# Patient Record
Sex: MALE | Race: WHITE
[De-identification: names, ages, dates, MRNs, and addresses within clinical notes are randomized per-mention and may not be internally consistent; named-entity substitution may affect disease eponyms.]

---

## 2017-07-13 ENCOUNTER — HOSPITAL ENCOUNTER (OUTPATIENT)
Dept: HOSPITAL 53 - M SMT | Age: 72
End: 2017-07-13
Attending: ALLERGY & IMMUNOLOGY
Payer: MEDICARE

## 2017-07-13 DIAGNOSIS — R10.13: Primary | ICD-10-CM

## 2017-07-13 DIAGNOSIS — R19.7: ICD-10-CM

## 2017-09-25 ENCOUNTER — HOSPITAL ENCOUNTER (OUTPATIENT)
Dept: HOSPITAL 53 - M LAB | Age: 72
End: 2017-09-25
Attending: INTERNAL MEDICINE
Payer: MEDICARE

## 2017-09-25 DIAGNOSIS — R19.7: Primary | ICD-10-CM

## 2017-09-25 LAB
ALBUMIN SERPL BCG-MCNC: 3.8 GM/DL (ref 3.2–5.2)
ALBUMIN/GLOB SERPL: 1.23 {RATIO} (ref 1–1.93)
ALP SERPL-CCNC: 73 U/L (ref 45–117)
ALT SERPL W P-5'-P-CCNC: 34 U/L (ref 12–78)
AST SERPL-CCNC: 21 U/L (ref 15–37)
BASOPHILS # BLD AUTO: 0 10^3/UL (ref 0–0.2)
BASOPHILS NFR BLD AUTO: 0.4 % (ref 0–1)
BILIRUB CONJ SERPL-MCNC: 0.1 MG/DL (ref 0–0.2)
BILIRUB SERPL-MCNC: 0.3 MG/DL (ref 0.2–1)
EOSINOPHIL # BLD AUTO: 0.2 10^3/UL (ref 0–0.5)
EOSINOPHIL NFR BLD AUTO: 1.9 % (ref 0–3)
ERYTHROCYTE [DISTWIDTH] IN BLOOD BY AUTOMATED COUNT: 13.1 % (ref 11.5–14.5)
IGA SERPL-MCNC: 247 MG/DL (ref 70–400)
IMM GRANULOCYTES NFR BLD: 0.5 % (ref 0–0)
LYMPHOCYTES # BLD AUTO: 1.2 10^3/UL (ref 1.5–4.5)
LYMPHOCYTES NFR BLD AUTO: 14.8 % (ref 24–44)
MCH RBC QN AUTO: 32.3 PG (ref 27–33)
MCHC RBC AUTO-ENTMCNC: 34.4 G/DL (ref 32–36.5)
MCV RBC AUTO: 93.8 FL (ref 80–96)
MONOCYTES # BLD AUTO: 0.8 10^3/UL (ref 0–0.8)
MONOCYTES NFR BLD AUTO: 9.9 % (ref 0–5)
NEUTROPHILS # BLD AUTO: 6.1 10^3/UL (ref 1.8–7.7)
NEUTROPHILS NFR BLD AUTO: 72.5 % (ref 36–66)
NRBC BLD AUTO-RTO: 0 % (ref 0–0)
PLATELET # BLD AUTO: 222 10^3/UL (ref 150–450)
PROT SERPL-MCNC: 6.9 GM/DL (ref 6.4–8.2)
WBC # BLD AUTO: 8.4 10^3/UL (ref 4–10)

## 2017-09-26 ENCOUNTER — HOSPITAL ENCOUNTER (OUTPATIENT)
Dept: HOSPITAL 53 - M LAB REF | Age: 72
End: 2017-09-26
Attending: INTERNAL MEDICINE
Payer: MEDICARE

## 2017-09-26 DIAGNOSIS — R19.7: Primary | ICD-10-CM

## 2017-10-13 ENCOUNTER — HOSPITAL ENCOUNTER (EMERGENCY)
Dept: HOSPITAL 53 - M ED | Age: 72
Discharge: HOME | End: 2017-10-13
Payer: MEDICARE

## 2017-10-13 VITALS — SYSTOLIC BLOOD PRESSURE: 109 MMHG | DIASTOLIC BLOOD PRESSURE: 64 MMHG

## 2017-10-13 VITALS — BODY MASS INDEX: 20.7 KG/M2 | WEIGHT: 124.25 LBS | HEIGHT: 65 IN

## 2017-10-13 DIAGNOSIS — G89.29: ICD-10-CM

## 2017-10-13 DIAGNOSIS — Z88.1: ICD-10-CM

## 2017-10-13 DIAGNOSIS — G43.909: Primary | ICD-10-CM

## 2017-10-13 DIAGNOSIS — Z87.891: ICD-10-CM

## 2017-10-13 DIAGNOSIS — Z79.899: ICD-10-CM

## 2017-10-13 LAB
ALBUMIN SERPL BCG-MCNC: 2.2 GM/DL (ref 3.2–5.2)
ALBUMIN/GLOB SERPL: 0.63 {RATIO} (ref 1–1.93)
ALP SERPL-CCNC: 94 U/L (ref 45–117)
ALT SERPL W P-5'-P-CCNC: 37 U/L (ref 12–78)
ANION GAP SERPL CALC-SCNC: 12 MEQ/L (ref 8–16)
AST SERPL-CCNC: 23 U/L (ref 15–37)
BASOPHILS # BLD AUTO: 0 10^3/UL (ref 0–0.2)
BASOPHILS NFR BLD AUTO: 0.2 % (ref 0–1)
BILIRUB CONJ SERPL-MCNC: 0.2 MG/DL (ref 0–0.2)
BILIRUB SERPL-MCNC: 0.5 MG/DL (ref 0.2–1)
BUN SERPL-MCNC: 17 MG/DL (ref 7–18)
CALCIUM SERPL-MCNC: 8 MG/DL (ref 8.8–10.2)
CHLORIDE SERPL-SCNC: 99 MEQ/L (ref 98–107)
CO2 SERPL-SCNC: 23 MEQ/L (ref 21–32)
CREAT SERPL-MCNC: 0.98 MG/DL (ref 0.7–1.3)
EOSINOPHIL # BLD AUTO: 0 10^3/UL (ref 0–0.5)
EOSINOPHIL NFR BLD AUTO: 0.1 % (ref 0–3)
ERYTHROCYTE [DISTWIDTH] IN BLOOD BY AUTOMATED COUNT: 12.3 % (ref 11.5–14.5)
GFR SERPL CREATININE-BSD FRML MDRD: > 60 ML/MIN/{1.73_M2} (ref 49–?)
GLUCOSE SERPL-MCNC: 142 MG/DL (ref 80–110)
IMM GRANULOCYTES NFR BLD: 0.7 % (ref 0–0)
LYMPHOCYTES # BLD AUTO: 0.7 10^3/UL (ref 1.5–4.5)
LYMPHOCYTES NFR BLD AUTO: 3.7 % (ref 24–44)
MCH RBC QN AUTO: 32 PG (ref 27–33)
MCHC RBC AUTO-ENTMCNC: 35.4 G/DL (ref 32–36.5)
MCV RBC AUTO: 90.4 FL (ref 80–96)
METHADONE UR QL SCN: NEGATIVE
MONOCYTES # BLD AUTO: 0.6 10^3/UL (ref 0–0.8)
MONOCYTES NFR BLD AUTO: 3 % (ref 0–5)
NEUTROPHILS # BLD AUTO: 16.8 10^3/UL (ref 1.8–7.7)
NEUTROPHILS NFR BLD AUTO: 92.3 % (ref 36–66)
NRBC BLD AUTO-RTO: 0 % (ref 0–0)
PLATELET # BLD AUTO: 405 10^3/UL (ref 150–450)
POTASSIUM SERPL-SCNC: 4.3 MEQ/L (ref 3.5–5.1)
PROT SERPL-MCNC: 5.7 GM/DL (ref 6.4–8.2)
SODIUM SERPL-SCNC: 134 MEQ/L (ref 136–145)
WBC # BLD AUTO: 18.2 10^3/UL (ref 4–10)

## 2017-10-13 PROCEDURE — 80048 BASIC METABOLIC PNL TOTAL CA: CPT

## 2017-10-13 PROCEDURE — 80307 DRUG TEST PRSMV CHEM ANLYZR: CPT

## 2017-10-13 PROCEDURE — 82553 CREATINE MB FRACTION: CPT

## 2017-10-13 PROCEDURE — 96375 TX/PRO/DX INJ NEW DRUG ADDON: CPT

## 2017-10-13 PROCEDURE — 82550 ASSAY OF CK (CPK): CPT

## 2017-10-13 PROCEDURE — 96374 THER/PROPH/DIAG INJ IV PUSH: CPT

## 2017-10-13 PROCEDURE — 99283 EMERGENCY DEPT VISIT LOW MDM: CPT

## 2017-10-13 PROCEDURE — 84484 ASSAY OF TROPONIN QUANT: CPT

## 2017-10-13 PROCEDURE — 80076 HEPATIC FUNCTION PANEL: CPT

## 2017-10-13 PROCEDURE — 84443 ASSAY THYROID STIM HORMONE: CPT

## 2017-10-13 PROCEDURE — 85025 COMPLETE CBC W/AUTO DIFF WBC: CPT

## 2017-10-13 PROCEDURE — 71020: CPT

## 2017-10-13 RX ADMIN — SODIUM CHLORIDE ONE MLS/HR: 9 INJECTION, SOLUTION INTRAVENOUS at 14:04

## 2017-10-13 RX ADMIN — KETOROLAC TROMETHAMINE ONE MG: 30 INJECTION, SOLUTION INTRAMUSCULAR at 14:04

## 2017-10-13 RX ADMIN — METOCLOPRAMIDE ONE MG: 5 INJECTION, SOLUTION INTRAMUSCULAR; INTRAVENOUS at 14:04

## 2017-10-13 RX ADMIN — DIPHENHYDRAMINE HYDROCHLORIDE STA MG: 50 INJECTION, SOLUTION INTRAMUSCULAR; INTRAVENOUS at 14:04

## 2017-10-13 NOTE — REP
Chest two views

 

HISTORY: Fever

 

Comparison: None

 

The lungs are clear.  The heart is normal in size.  The pulmonary vasculature is

normal in appearance.  The bony structure is intact.

 

IMPRESSION:  No acute disease.

 

 

Signed by

Javid Allen MD 10/13/2017 03:42 P